# Patient Record
Sex: FEMALE | Race: WHITE | NOT HISPANIC OR LATINO | URBAN - METROPOLITAN AREA
[De-identification: names, ages, dates, MRNs, and addresses within clinical notes are randomized per-mention and may not be internally consistent; named-entity substitution may affect disease eponyms.]

---

## 2017-09-02 ENCOUNTER — EMERGENCY (EMERGENCY)
Facility: HOSPITAL | Age: 47
LOS: 0 days | Discharge: HOME | End: 2017-09-02
Attending: EMERGENCY MEDICINE | Admitting: EMERGENCY MEDICINE
Payer: COMMERCIAL

## 2017-09-02 VITALS
RESPIRATION RATE: 18 BRPM | DIASTOLIC BLOOD PRESSURE: 71 MMHG | TEMPERATURE: 98 F | SYSTOLIC BLOOD PRESSURE: 121 MMHG | HEART RATE: 64 BPM | OXYGEN SATURATION: 100 %

## 2017-09-02 VITALS — HEIGHT: 66 IN | WEIGHT: 145.06 LBS
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
## 2017-09-02 DIAGNOSIS — S42.291A OTHER DISPLACED FRACTURE OF UPPER END OF RIGHT HUMERUS, INITIAL ENCOUNTER FOR CLOSED FRACTURE: ICD-10-CM

## 2017-09-02 DIAGNOSIS — M79.601 PAIN IN RIGHT ARM: ICD-10-CM
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
## 2017-09-02 PROCEDURE — 29130 APPL FINGER SPLINT STATIC: CPT | Mod: RT

## 2017-09-02 PROCEDURE — 73030 X-RAY EXAM OF SHOULDER: CPT | Mod: 26,RT

## 2017-09-02 PROCEDURE — 29240 STRAPPING OF SHOULDER: CPT | Mod: RT

## 2017-09-02 PROCEDURE — 73060 X-RAY EXAM OF HUMERUS: CPT | Mod: 26,RT

## 2017-09-02 PROCEDURE — 99285 EMERGENCY DEPT VISIT HI MDM: CPT

## 2017-09-02 RX ORDER — OXYCODONE AND ACETAMINOPHEN 5; 325 MG/1; MG/1
1 TABLET ORAL ONCE
Qty: 0 | Refills: 0 | Status: DISCONTINUED | OUTPATIENT
Start: 2017-09-02 | End: 2017-09-02

## 2017-09-02 RX ORDER — IBUPROFEN 200 MG
600 TABLET ORAL ONCE
Qty: 0 | Refills: 0 | Status: COMPLETED | OUTPATIENT
Start: 2017-09-02 | End: 2017-09-02

## 2017-09-02 RX ADMIN — Medication 600 MILLIGRAM(S): at 19:45

## 2017-09-02 RX ADMIN — OXYCODONE AND ACETAMINOPHEN 1 TABLET(S): 5; 325 TABLET ORAL at 19:45

## 2017-09-02 NOTE — ED STATDOCS - PROGRESS NOTE DETAILS
Patient seen and evaluated, +fx on xray, placed in shoulder immobilizer, RICE reviewed, will send rx for pain medication -Tiesha Robins PA-C Patient was agreeable to plan, understood that orthopedic f/u in a few days and wearing immobilizer was appropriate treatment.  Pain medication sent to pharmacy requested by patient after being given a dose here in the ER.  Patient sister came to pick her up and started complaining that patient was not appropriately treated and pain medication was sent to a pharmacy that is not 24 hours and demanded to be seen by orthopedic because it is a holiday weekend and she shouldn't have to wait.  Case d/w Dr. Alanis who was already on his way in for another case and agreed to see her.  Will send 2 doses of pain medication to a 24 hour pharmacy -Tiesha Robins PA-C

## 2017-09-02 NOTE — ED STATDOCS - OBJECTIVE STATEMENT
47 y/o female with no PMHx presents to the ED c/o right arm pain s/p fall 3 hours ago.  She tripped on rug, fell and hit right arm.  She is c/o pain to right should when she tries to move her arm, no resting pain.  Denies back pain, hip pain.  Did not hit head, no LOC  Has not taken anything for pain.

## 2017-09-02 NOTE — ED STATDOCS - MEDICAL DECISION MAKING DETAILS
45 y/o female presents to the ED c/o right arm with movement s/p fall. Plan pain control, XR, reassess.

## 2017-09-02 NOTE — ED STATDOCS - MUSCULOSKELETAL, MLM
No right upper extremity TPP.  +Range of motion limited right shoulder and right elbow due to pain.  Full ROM right wrist.  Strong radial pulse.  No scaphoid TPP.  No C-spine TPP No right upper extremity TPP.  +Range of motion limited right shoulder and right elbow due to pain.  Full ROM right wrist.  Strong radial pulse.  Neurovascularly intact.  No scaphoid TPP.  No C-spine TPP

## 2017-09-02 NOTE — ED PROCEDURE NOTE - NS ED PERI VASCULAR NEG
no cyanosis of extremity/fingers/toes warm to touch/capillary refill time < 2 seconds/no paresthesia/no swelling

## 2021-02-28 ENCOUNTER — NON-APPOINTMENT (OUTPATIENT)
Age: 51
End: 2021-02-28

## 2021-04-15 ENCOUNTER — NON-APPOINTMENT (OUTPATIENT)
Age: 51
End: 2021-04-15

## 2021-04-15 DIAGNOSIS — J01.40 ACUTE PANSINUSITIS, UNSPECIFIED: ICD-10-CM

## 2021-04-15 DIAGNOSIS — Z92.89 PERSONAL HISTORY OF OTHER MEDICAL TREATMENT: ICD-10-CM

## 2021-04-15 DIAGNOSIS — F41.1 GENERALIZED ANXIETY DISORDER: ICD-10-CM

## 2021-04-15 DIAGNOSIS — Z86.79 PERSONAL HISTORY OF OTHER DISEASES OF THE CIRCULATORY SYSTEM: ICD-10-CM

## 2021-04-15 DIAGNOSIS — E56.8 DEFICIENCY OF OTHER VITAMINS: ICD-10-CM

## 2021-04-15 DIAGNOSIS — R05 COUGH: ICD-10-CM

## 2021-04-15 DIAGNOSIS — Z78.9 OTHER SPECIFIED HEALTH STATUS: ICD-10-CM

## 2021-04-15 DIAGNOSIS — J06.9 ACUTE UPPER RESPIRATORY INFECTION, UNSPECIFIED: ICD-10-CM

## 2021-04-15 DIAGNOSIS — Z86.59 PERSONAL HISTORY OF OTHER MENTAL AND BEHAVIORAL DISORDERS: ICD-10-CM

## 2021-04-15 DIAGNOSIS — G43.909 MIGRAINE, UNSPECIFIED, NOT INTRACTABLE, W/OUT STATUS MIGRAINOSUS: ICD-10-CM

## 2021-04-15 RX ORDER — FLUTICASONE PROPIONATE 50 UG/1
SPRAY, METERED NASAL
Refills: 0 | Status: ACTIVE | COMMUNITY

## 2021-04-22 ENCOUNTER — APPOINTMENT (OUTPATIENT)
Dept: FAMILY MEDICINE | Facility: CLINIC | Age: 51
End: 2021-04-22

## 2021-04-28 ENCOUNTER — APPOINTMENT (OUTPATIENT)
Dept: FAMILY MEDICINE | Facility: CLINIC | Age: 51
End: 2021-04-28

## 2021-05-04 ENCOUNTER — APPOINTMENT (OUTPATIENT)
Dept: FAMILY MEDICINE | Facility: CLINIC | Age: 51
End: 2021-05-04
Payer: COMMERCIAL

## 2021-05-04 VITALS
OXYGEN SATURATION: 99 % | HEART RATE: 76 BPM | WEIGHT: 140 LBS | BODY MASS INDEX: 21.22 KG/M2 | TEMPERATURE: 96.9 F | DIASTOLIC BLOOD PRESSURE: 62 MMHG | SYSTOLIC BLOOD PRESSURE: 108 MMHG | HEIGHT: 68 IN

## 2021-05-04 DIAGNOSIS — Z00.00 ENCOUNTER FOR GENERAL ADULT MEDICAL EXAMINATION W/OUT ABNORMAL FINDINGS: ICD-10-CM

## 2021-05-04 PROCEDURE — 99072 ADDL SUPL MATRL&STAF TM PHE: CPT

## 2021-05-04 PROCEDURE — 99396 PREV VISIT EST AGE 40-64: CPT

## 2021-05-04 RX ORDER — ALBUTEROL 90 MCG
90 AEROSOL (GRAM) INHALATION
Refills: 0 | Status: DISCONTINUED | COMMUNITY
End: 2021-05-04

## 2021-05-04 RX ORDER — ONDANSETRON 8 MG/1
8 TABLET, ORALLY DISINTEGRATING ORAL
Refills: 0 | Status: DISCONTINUED | COMMUNITY
End: 2021-05-04

## 2021-05-04 RX ORDER — MECLIZINE HYDROCHLORIDE 25 MG/1
25 TABLET ORAL
Refills: 0 | Status: DISCONTINUED | COMMUNITY
End: 2021-05-04

## 2021-05-04 NOTE — HEALTH RISK ASSESSMENT
[Very Good] : ~his/her~  mood as very good [Yes] : Yes [Monthly or less (1 pt)] : Monthly or less (1 point) [1 or 2 (0 pts)] : 1 or 2 (0 points) [Never (0 pts)] : Never (0 points) [0] : 2) Feeling down, depressed, or hopeless: Not at all (0) [Patient reported mammogram was normal] : Patient reported mammogram was normal [Patient reported PAP Smear was normal] : Patient reported PAP Smear was normal [With Family] : lives with family [College] : College [] :  [# Of Children ___] : has [unfilled] children [Sexually Active] : sexually active [Smoke Detector] : smoke detector [Carbon Monoxide Detector] : carbon monoxide detector [Safety elements used in home] : safety elements used in home [Seat Belt] :  uses seat belt [Sunscreen] : uses sunscreen [] : No [No falls in past year] : Patient reported no falls in the past year [de-identified] : walking intermittently  [de-identified] : healthy  [Change in mental status noted] : No change in mental status noted [Feels Safe at Home] : Feels safe at home [Reports changes in hearing] : Reports no changes in hearing [Reports changes in vision] : Reports no changes in vision [Reports normal functional visual acuity (ie: able to read med bottle)] : Reports normal functional visual acuity [Reports changes in dental health] : Reports no changes in dental health [MammogramDate] : 2018  [PapSmearDate] : 3/2021  [BoneDensityDate] : 2018  [BoneDensityComments] : osteopenia  [ColonoscopyComments] : never [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

## 2021-05-04 NOTE — PAST MEDICAL HISTORY
[Perimenopausal] : perimenopausal [Irregular Cycle Intervals] : are  irregular [Total Preg ___] : G[unfilled] [Live Births ___] : P[unfilled]

## 2021-05-04 NOTE — PLAN
[FreeTextEntry1] : \par \par Patient had normal exam today , fasting labs drawn this am at Cardiology . Provided pt with Mammogram request and reminded she is due for Colonoscopy \par Will repeat PE in one year

## 2021-05-04 NOTE — HISTORY OF PRESENT ILLNESS
[de-identified] : Patient is here for yearly PE , she is feeling much improved since her URI /Covid Symptoms last month. She has been seen at Pulmonology and then Cardiology , due to mild Pericardial Effusion . Pt had full comprehensive labs this am for Cardiology at [facility]. SHe presently is not taking any inhalers as she is asymptomatic \par She admits she is due for Mammogram and Colonoscopy

## 2021-05-12 ENCOUNTER — APPOINTMENT (OUTPATIENT)
Age: 51
End: 2021-05-12

## 2021-07-26 ENCOUNTER — NON-APPOINTMENT (OUTPATIENT)
Age: 51
End: 2021-07-26

## 2022-02-02 ENCOUNTER — APPOINTMENT (OUTPATIENT)
Dept: FAMILY MEDICINE | Facility: CLINIC | Age: 52
End: 2022-02-02
Payer: COMMERCIAL

## 2022-02-02 VITALS
OXYGEN SATURATION: 99 % | HEART RATE: 73 BPM | DIASTOLIC BLOOD PRESSURE: 78 MMHG | TEMPERATURE: 97.5 F | SYSTOLIC BLOOD PRESSURE: 118 MMHG | WEIGHT: 138 LBS | BODY MASS INDEX: 20.92 KG/M2 | HEIGHT: 68 IN

## 2022-02-02 PROCEDURE — 99213 OFFICE O/P EST LOW 20 MIN: CPT

## 2022-02-02 NOTE — PHYSICAL EXAM
[No Edema] : there was no peripheral edema [Normal] : no rash [Coordination Grossly Intact] : coordination grossly intact [No Focal Deficits] : no focal deficits [Normal Gait] : normal gait [Normal Affect] : the affect was normal [Normal Insight/Judgement] : insight and judgment were intact [de-identified] : small amount of cerumen bilaterally worse on left than right, visualized TMs normal [de-identified] : CN II-XII intact, no facial asymmetry, no nystagmus

## 2022-02-02 NOTE — HEALTH RISK ASSESSMENT
[Former] : Former [Yes] : Yes [Monthly or less (1 pt)] : Monthly or less (1 point) [1 or 2 (0 pts)] : 1 or 2 (0 points) [Never (0 pts)] : Never (0 points) [No] : In the past 12 months have you used drugs other than those required for medical reasons? No [No falls in past year] : Patient reported no falls in the past year [0] : 2) Feeling down, depressed, or hopeless: Not at all (0) [PHQ-2 Negative - No further assessment needed] : PHQ-2 Negative - No further assessment needed [YearQuit] : formerly social [Audit-CScore] : 1 [de-identified] : active -- walking [de-identified] : well balanced [[id number]] : 0

## 2022-02-02 NOTE — HISTORY OF PRESENT ILLNESS
[FreeTextEntry8] : Pt is a 52yo female presenting to the office complaining of mild dizziness for the past several days.\par Pt states she has had intermittent dizziness since Friday, 01/28/2022\par Received acupuncture last week had some dizziness after\par pt states she is feeling off, slight dizziness, no lightheadedness/near syncope, no room spinning\par dizziness seems to be positional, worse with head movement, cannot lay on her right side without becoming dizzy.\par denies headaches, vision/hearing changes, difficulty with speech/swallow, or extremity numbness/tingling/weakness\par denies chest pain or SOB\par Has nausea but no vomiting\par Took Zofran last night with improvement of nausea and dizziness.\par Has booster shot scheduled for tomorrow which is causing her to have increased anxiety.\par Long COVID January\par Received Moderna x2 in June\par Had dizziness, didn't feel well approximately 1 week after immunizations\par \par Pt has history of pericardial effusion -- resolved.\par New Cardiologist Dr. Hernandez\par Cardiology formerly Dr. Miller, was previously on Statin for calcium found on coronary CTA however pt felt dizziness was worse with statin, saw Dr. Hernandez who does not believe pt needs to be on statin so d/c'ed.

## 2022-02-02 NOTE — ASSESSMENT
[FreeTextEntry1] : Patient is a 50yo female presenting to the office complaining of mild positional dizziness for the last several days, improved with Zofran yesterday.  Has COVID booster vaccination (pfizer) scheduled for tomorrow and is concerned as she has had dizziness as a side effect from her previous 2 COVID (Moderna) vaccinations.\par \par Dizziness\par - Take Meclizine every 8 hours as needed for dizziness.  Do not drink alcohol, drive or operate heavy machinery when taking Meclizine as it causes drowsiness.\par - Consider Benadryl prior to COVID vaccination and as needed for dizziness.  Do not drink alcohol, drive or operate heavy machinery when taking Benadryl as it causes drowsiness.\par - Continue Zofran as needed for nausea.\par - Call the office or go to the ED immediately if you develop new, worsening or concerning symptoms including high fever, severe headache/worst headache of your life, confusion, dizziness/lightheadedness, loss of consciousness, severe chest pain, difficulty breathing, shortness of breath, severe abdominal pain, excessive vomiting/diarrhea, inability to feel/move the extremities, or any other concerning symptoms.\par

## 2022-03-10 ENCOUNTER — APPOINTMENT (OUTPATIENT)
Dept: NEUROLOGY | Facility: CLINIC | Age: 52
End: 2022-03-10

## 2022-06-26 DIAGNOSIS — Z87.01 PERSONAL HISTORY OF PNEUMONIA (RECURRENT): ICD-10-CM

## 2022-06-28 ENCOUNTER — NON-APPOINTMENT (OUTPATIENT)
Age: 52
End: 2022-06-28

## 2022-06-28 ENCOUNTER — APPOINTMENT (OUTPATIENT)
Dept: FAMILY MEDICINE | Facility: CLINIC | Age: 52
End: 2022-06-28
Payer: COMMERCIAL

## 2022-06-28 VITALS
SYSTOLIC BLOOD PRESSURE: 118 MMHG | OXYGEN SATURATION: 100 % | WEIGHT: 138 LBS | HEIGHT: 68 IN | DIASTOLIC BLOOD PRESSURE: 72 MMHG | TEMPERATURE: 97.6 F | HEART RATE: 76 BPM | BODY MASS INDEX: 20.92 KG/M2

## 2022-06-28 DIAGNOSIS — Z87.898 PERSONAL HISTORY OF OTHER SPECIFIED CONDITIONS: ICD-10-CM

## 2022-06-28 DIAGNOSIS — R92.2 INCONCLUSIVE MAMMOGRAM: ICD-10-CM

## 2022-06-28 PROCEDURE — 99396 PREV VISIT EST AGE 40-64: CPT | Mod: 25

## 2022-06-28 PROCEDURE — 36415 COLL VENOUS BLD VENIPUNCTURE: CPT

## 2022-06-28 PROCEDURE — 93000 ELECTROCARDIOGRAM COMPLETE: CPT

## 2022-06-28 RX ORDER — MECLIZINE HYDROCHLORIDE 25 MG/1
25 TABLET ORAL EVERY 6 HOURS
Qty: 40 | Refills: 2 | Status: ACTIVE | COMMUNITY
Start: 2021-07-29 | End: 1900-01-01

## 2022-06-28 RX ORDER — ONDANSETRON 4 MG/1
4 TABLET, ORALLY DISINTEGRATING ORAL 3 TIMES DAILY
Qty: 30 | Refills: 0 | Status: ACTIVE | COMMUNITY
Start: 2022-02-02 | End: 1900-01-01

## 2022-06-28 RX ORDER — ALPRAZOLAM 0.25 MG/1
0.25 TABLET ORAL
Qty: 30 | Refills: 0 | Status: ACTIVE | COMMUNITY
Start: 1900-01-01 | End: 1900-01-01

## 2022-06-28 NOTE — PHYSICAL EXAM
[No Acute Distress] : no acute distress [Well Nourished] : well nourished [Well Developed] : well developed [Well-Appearing] : well-appearing [Normal Sclera/Conjunctiva] : normal sclera/conjunctiva [EOMI] : extraocular movements intact [Normal Outer Ear/Nose] : the outer ears and nose were normal in appearance [No JVD] : no jugular venous distention [No Lymphadenopathy] : no lymphadenopathy [Supple] : supple [Thyroid Normal, No Nodules] : the thyroid was normal and there were no nodules present [No Accessory Muscle Use] : no accessory muscle use [No Respiratory Distress] : no respiratory distress  [Clear to Auscultation] : lungs were clear to auscultation bilaterally [Normal Rate] : normal rate  [Regular Rhythm] : with a regular rhythm [Normal S1, S2] : normal S1 and S2 [No Murmur] : no murmur heard [No Carotid Bruits] : no carotid bruits [No Varicosities] : no varicosities [Pedal Pulses Present] : the pedal pulses are present [No Edema] : there was no peripheral edema [No Extremity Clubbing/Cyanosis] : no extremity clubbing/cyanosis [Soft] : abdomen soft [Non Tender] : non-tender [Non-distended] : non-distended [No Masses] : no abdominal mass palpated [No HSM] : no HSM [Normal Bowel Sounds] : normal bowel sounds [Normal Posterior Cervical Nodes] : no posterior cervical lymphadenopathy [Normal Anterior Cervical Nodes] : no anterior cervical lymphadenopathy [No Joint Swelling] : no joint swelling [Grossly Normal Strength/Tone] : grossly normal strength/tone [No Rash] : no rash [Coordination Grossly Intact] : coordination grossly intact [No Focal Deficits] : no focal deficits [Normal Gait] : normal gait [Normal Affect] : the affect was normal [Normal Insight/Judgement] : insight and judgment were intact [de-identified] : slender frame

## 2022-06-28 NOTE — REVIEW OF SYSTEMS
[Anxiety] : anxiety [Negative] : Heme/Lymph [Suicidal] : not suicidal [Insomnia] : no insomnia [Depression] : no depression [de-identified] : situational anxiety for which she uses self care measures and occas Alprazolam (well tolerated and effective and she limits use to rare/occas)

## 2022-06-28 NOTE — ASSESSMENT
[FreeTextEntry1] : LISSET ZARATE is a 51 year old female here for a physical exam.  She is also here to follow up on medical issues as noted above.\par

## 2022-06-28 NOTE — PLAN
[FreeTextEntry1] : Reviewed age-appropriate preventive screening tests with patient. UTD on gyn exam. Due for mammogram and baseline CRC screening (recommend colonoscopy in her case given FH colon cancer and colon polyps) and she will schedule for near future. ECG wnl .\par \par Shingrix revd/recommended and she will consider. Also recommended she get a dose of PCV 20 due to h/o pneumonia and she will consider. \par \par Check labs today. Continue same meds/doses pending lab results. \par \par Discussed clean eating (eg Mediterranean style eating plan) and regular exercise/staying as physically active as possible.  Include balance exercises and strength training and core strengthening exercises for bone health and to decrease risk for falls. \par \par Reviewed importance of good self care (eg meditation, yoga, adequate rest, regular exercise, magnesium, clean eating etc).\par \par Revd r/b/se Alprazolam and need to cont to limit use as much as possible as she does. If incr use over time she should consider trial of daily SSRI type med (not needed at this time as she has only occas/situational sxs).\par \par Meclizine and Ondansetron revd (indications for use, what these meds treat, how to use them etc) and she would like RF as prior meds are  and she will replace supply as likes to keep these on hand for prn use mainly with travel \par \par Next CPE in 1-2 years.

## 2022-06-28 NOTE — HISTORY OF PRESENT ILLNESS
[de-identified] : Her last PE was last year\par \par Vaccines:\par Tetanus shot is up to date, Tdap 8/2017\par Pneumococcal vaccination is NOT  up to date, recommend dose of PCV 20 due to h/o pneumonia\par Shingrix vaccination is NOT up to date\par COVID vaccine is up to date x 3 doses\par \par Her last dentist visit was within past 6-12 months\par Her last eye doctor appointment was within past year\par \par Her diet is healthful/clean overall\par Exercise: walking briskly\par \par Her GYN visits are up to date, Spring 2022, Dr. Hendrix, had polyp (?cervical, ?endometrial) that was removed in office and is wnl. \par Her Mammogram screening is NOT up to date, 3/2021, plans to schedule for near future \par Her Colorectal cancer screening is NOT up to date, never had any screening\par \par Zenaida has h/o situational anxiety (flights, travel) for which she keeps Alprazolam on hand for occas/rare use. \par \par Travelling to [place] to see her friend this summer and wanted to check if UTD Tdap (we revd that she is). Also she had an episode of anxiety/dizziness/nausea and vomiting in the past triggered by a combination of anxiety and motion sickness so she keeps Alprazolam, Meclizine, Ondansetron on hand for prn use. Rarely to never uses these meds but likes to keep on hand. Meds are well tolerated when needed\par \par Had COVID infection in 12/2020. When had acute COVID she intermittent odd feeling in back of neck that resolved; was possibly associated with some anter neck/throat sensation (not pain). Not any trouble with swallowing. Had no sxs since that COVID infection until past few days has noticed occas odd sensation (not pain) post neck similar to what she had when had COVID infection. Did COVID test last night and was negative. Did acupuncture and was having treatment for laryngeal pain when had COVID infection in past and thinks that helped the neck sensations to resolve . No pain or other assoc sxs and feels well. No seasonal allergies. We revd that I am not sure what her sxs are due to but in isolation I am not concerned about intermittent mild odd sensation post neck without other assoc sxs like pain, fever, stiffness etc). If other sxs develop she should RTO for eval/further discussion. Also can consider trying acupuncture again as worked in past. Also if any throat/post nasal drip sxs can try nasal saline/irrigation, nasal steroid spray etc \par \par Saw card in early 2021 (as friend who is an RN advised that anyone who had COVID should have card eval post infection) after she had COVID. Was told she had high chol and was given Rx med for this (?name). Then saw second card (Dr. Hernandez) who reassured that her chol really is ok (mainly TC is high because she has high HDL level) and med was stopped.

## 2022-06-28 NOTE — HEALTH RISK ASSESSMENT
[0] : 2) Feeling down, depressed, or hopeless: Not at all (0) [PHQ-2 Negative - No further assessment needed] : PHQ-2 Negative - No further assessment needed [[id number]] : 0

## 2022-06-29 ENCOUNTER — NON-APPOINTMENT (OUTPATIENT)
Age: 52
End: 2022-06-29

## 2022-06-29 LAB
ALBUMIN SERPL ELPH-MCNC: 5 G/DL
ALP BLD-CCNC: 98 U/L
ALT SERPL-CCNC: 16 U/L
ANION GAP SERPL CALC-SCNC: 11 MMOL/L
AST SERPL-CCNC: 21 U/L
BILIRUB SERPL-MCNC: 1.2 MG/DL
BUN SERPL-MCNC: 11 MG/DL
CALCIUM SERPL-MCNC: 9.9 MG/DL
CHLORIDE SERPL-SCNC: 104 MMOL/L
CHOLEST SERPL-MCNC: 263 MG/DL
CO2 SERPL-SCNC: 28 MMOL/L
CREAT SERPL-MCNC: 0.66 MG/DL
EGFR: 105 ML/MIN/1.73M2
GLUCOSE SERPL-MCNC: 91 MG/DL
HDLC SERPL-MCNC: 95 MG/DL
LDLC SERPL CALC-MCNC: 146 MG/DL
NONHDLC SERPL-MCNC: 168 MG/DL
POTASSIUM SERPL-SCNC: 5 MMOL/L
PROT SERPL-MCNC: 7.2 G/DL
SODIUM SERPL-SCNC: 143 MMOL/L
TRIGL SERPL-MCNC: 107 MG/DL
TSH SERPL-ACNC: 1.76 UIU/ML

## 2022-07-15 ENCOUNTER — NON-APPOINTMENT (OUTPATIENT)
Age: 52
End: 2022-07-15

## 2022-07-22 ENCOUNTER — NON-APPOINTMENT (OUTPATIENT)
Age: 52
End: 2022-07-22

## 2022-09-28 ENCOUNTER — MED ADMIN CHARGE (OUTPATIENT)
Age: 52
End: 2022-09-28

## 2022-09-28 ENCOUNTER — APPOINTMENT (OUTPATIENT)
Dept: FAMILY MEDICINE | Facility: CLINIC | Age: 52
End: 2022-09-28

## 2022-09-28 DIAGNOSIS — Z11.1 ENCOUNTER FOR SCREENING FOR RESPIRATORY TUBERCULOSIS: ICD-10-CM

## 2022-09-28 PROCEDURE — 86580 TB INTRADERMAL TEST: CPT

## 2022-09-30 ENCOUNTER — APPOINTMENT (OUTPATIENT)
Dept: FAMILY MEDICINE | Facility: CLINIC | Age: 52
End: 2022-09-30

## 2022-09-30 PROCEDURE — ZZZZZ: CPT

## 2022-10-21 NOTE — ED ADULT NURSE NOTE - OBJECTIVE STATEMENT
s/p trip and fall No Repair - Repaired With Adjacent Surgical Defect Text (Leave Blank If You Do Not Want): After obtaining clear surgical margins the defect was repaired concurrently with another surgical defect which was in close approximation.

## 2023-01-18 ENCOUNTER — NON-APPOINTMENT (OUTPATIENT)
Age: 53
End: 2023-01-18

## 2023-01-23 ENCOUNTER — NON-APPOINTMENT (OUTPATIENT)
Age: 53
End: 2023-01-23

## 2023-01-24 ENCOUNTER — APPOINTMENT (OUTPATIENT)
Dept: FAMILY MEDICINE | Facility: CLINIC | Age: 53
End: 2023-01-24
Payer: COMMERCIAL

## 2023-01-24 VITALS
DIASTOLIC BLOOD PRESSURE: 62 MMHG | OXYGEN SATURATION: 98 % | HEART RATE: 79 BPM | SYSTOLIC BLOOD PRESSURE: 114 MMHG | WEIGHT: 151 LBS | BODY MASS INDEX: 22.88 KG/M2 | TEMPERATURE: 97 F | HEIGHT: 68 IN

## 2023-01-24 DIAGNOSIS — R63.5 ABNORMAL WEIGHT GAIN: ICD-10-CM

## 2023-01-24 PROCEDURE — 36415 COLL VENOUS BLD VENIPUNCTURE: CPT

## 2023-01-24 PROCEDURE — 99214 OFFICE O/P EST MOD 30 MIN: CPT | Mod: 25

## 2023-01-24 RX ORDER — AZITHROMYCIN 250 MG/1
250 TABLET, FILM COATED ORAL
Qty: 1 | Refills: 0 | Status: DISCONTINUED | COMMUNITY
Start: 2022-07-22 | End: 2023-01-24

## 2023-01-24 NOTE — HISTORY OF PRESENT ILLNESS
[FreeTextEntry8] : Pt is a 51yo female presenting to the office complaining of weight gain, fatigue.\par Pt reports weight gain.  Does not weigh herself at home but has noticed difficulty buttoning pants for the past 2-3 months.\par Last weighted at last visit in 06/2022 was 138lbs, today was 151lbs.\par States she feels very fatigued as well.\par Has mild intermittent dizziness, has been ongoing for a long time, states has an issue with sitting in a car, feels off balanced, etc.  Has had w/u with ENT which has been normal.\par Pt has mild shortness of breath/palpitations occasionally which is chronic.\par Dizziness/SOB/palpitations are all chronic and not changed since onset of current symptoms.\par Denies chest pain.\par Pt admits she has had a poor diet recently/through the holidays.\par Would like thyroid testing.\par Pt's mother and cousins have history of hypothyroidism.\par Denies skin/hair changes.\par Denies abnormal vaginal bleeding, BRBPR, dark/tarry stools, or bleeding elsewhere.\par Denies known tick exposure.

## 2023-01-24 NOTE — ASSESSMENT
[FreeTextEntry1] : Patient is a 51yo female presenting to the office complaining of fatigue, weight gain over the past 2-3 months.  Pt has chronic dizziness, palpitations, and SOB which have not changed with current symptoms.  Last EKG in 06/2022 WNL.\par \par Fatigue, Weight Gain\par - Labs drawn in office.  Pt fasting and with hx HLD so will check cholesterol levels as well today.\par - Make sure you are staying well hydrated, drinking plenty of water.\par - Eat plenty of fruits and vegetables, especially deeply colored fruits/vegetables (such as leafy greens, peaches) that are more nutrient-dense.  Continue to work hard on diet and exercise, limiting/avoiding saturated fat, fatty foods, greasy foods, red meats, white flour-based carbohydrates (cookies, cakes, white bread, white rice), and added sugars.  Chose whole grain foods and products made with whole grains over refined grains and white flour-based carbohydrates.  Avoid beverages and food with added sugar.  Limit salt intake to improve blood pressure.  Limit alcohol intake.\par - Try and incorporate 30 minutes of aerobic exercise to your daily routine.\par \par Call the office or go to the ED immediately if you develop new, worsening or concerning symptoms including high fever, severe headache/worst headache of your life, confusion, dizziness/lightheadedness, loss of consciousness, severe chest pain, difficulty breathing, shortness of breath, severe abdominal pain, excessive vomiting/diarrhea, inability to feel/move the extremities, or any other concerning symptoms.\par

## 2023-01-24 NOTE — REVIEW OF SYSTEMS
[Fever] : no fever [Chills] : no chills [Fatigue] : fatigue [Recent Change In Weight] : ~T recent weight change [Chest Pain] : no chest pain [Palpitations] : palpitations [Shortness Of Breath] : shortness of breath [Wheezing] : no wheezing [Cough] : no cough [Dyspnea on Exertion] : no dyspnea on exertion [Headache] : no headache [Dizziness] : dizziness [Negative] : Heme/Lymph

## 2023-01-25 LAB
25(OH)D3 SERPL-MCNC: 31 NG/ML
ALBUMIN SERPL ELPH-MCNC: 4.8 G/DL
ALP BLD-CCNC: 98 U/L
ALT SERPL-CCNC: 18 U/L
ANION GAP SERPL CALC-SCNC: 9 MMOL/L
AST SERPL-CCNC: 21 U/L
BASOPHILS # BLD AUTO: 0.03 K/UL
BASOPHILS NFR BLD AUTO: 0.4 %
BILIRUB SERPL-MCNC: 1 MG/DL
BUN SERPL-MCNC: 13 MG/DL
CALCIUM SERPL-MCNC: 10 MG/DL
CHLORIDE SERPL-SCNC: 103 MMOL/L
CHOLEST SERPL-MCNC: 258 MG/DL
CO2 SERPL-SCNC: 30 MMOL/L
CREAT SERPL-MCNC: 0.65 MG/DL
EGFR: 105 ML/MIN/1.73M2
EOSINOPHIL # BLD AUTO: 0.15 K/UL
EOSINOPHIL NFR BLD AUTO: 1.9 %
GLUCOSE SERPL-MCNC: 87 MG/DL
HCT VFR BLD CALC: 43.3 %
HDLC SERPL-MCNC: 83 MG/DL
HGB BLD-MCNC: 14.2 G/DL
IMM GRANULOCYTES NFR BLD AUTO: 0.1 %
LDLC SERPL CALC-MCNC: 142 MG/DL
LYMPHOCYTES # BLD AUTO: 1.78 K/UL
LYMPHOCYTES NFR BLD AUTO: 23 %
MAN DIFF?: NORMAL
MCHC RBC-ENTMCNC: 31.6 PG
MCHC RBC-ENTMCNC: 32.8 GM/DL
MCV RBC AUTO: 96.2 FL
MONOCYTES # BLD AUTO: 0.69 K/UL
MONOCYTES NFR BLD AUTO: 8.9 %
NEUTROPHILS # BLD AUTO: 5.08 K/UL
NEUTROPHILS NFR BLD AUTO: 65.7 %
NONHDLC SERPL-MCNC: 175 MG/DL
PLATELET # BLD AUTO: 212 K/UL
POTASSIUM SERPL-SCNC: 4.4 MMOL/L
PROT SERPL-MCNC: 7.3 G/DL
RBC # BLD: 4.5 M/UL
RBC # FLD: 13 %
SODIUM SERPL-SCNC: 142 MMOL/L
T4 FREE SERPL-MCNC: 1.2 NG/DL
TRIGL SERPL-MCNC: 165 MG/DL
TSH SERPL-ACNC: 2.02 UIU/ML
VIT B12 SERPL-MCNC: 694 PG/ML
WBC # FLD AUTO: 7.74 K/UL

## 2023-01-27 ENCOUNTER — APPOINTMENT (OUTPATIENT)
Dept: FAMILY MEDICINE | Facility: CLINIC | Age: 53
End: 2023-01-27

## 2023-01-30 ENCOUNTER — NON-APPOINTMENT (OUTPATIENT)
Age: 53
End: 2023-01-30

## 2023-06-30 ENCOUNTER — APPOINTMENT (OUTPATIENT)
Dept: FAMILY MEDICINE | Facility: CLINIC | Age: 53
End: 2023-06-30

## 2023-06-30 DIAGNOSIS — E78.00 PURE HYPERCHOLESTEROLEMIA, UNSPECIFIED: ICD-10-CM

## 2023-06-30 DIAGNOSIS — Z00.00 ENCOUNTER FOR GENERAL ADULT MEDICAL EXAMINATION W/OUT ABNORMAL FINDINGS: ICD-10-CM

## 2023-06-30 DIAGNOSIS — F41.8 OTHER SPECIFIED ANXIETY DISORDERS: ICD-10-CM

## 2024-03-01 NOTE — CURRENT MEDS
[Takes medication as prescribed] : takes
Presents to ED c/o 2 weeks of fever, cough and headache. Pt states that she has been sick, but last night developed a headache which promoted her to come to the ED.

## 2024-05-30 ENCOUNTER — APPOINTMENT (OUTPATIENT)
Dept: FAMILY MEDICINE | Facility: CLINIC | Age: 54
End: 2024-05-30
Payer: COMMERCIAL

## 2024-05-30 VITALS
SYSTOLIC BLOOD PRESSURE: 118 MMHG | WEIGHT: 146 LBS | DIASTOLIC BLOOD PRESSURE: 72 MMHG | BODY MASS INDEX: 22.13 KG/M2 | HEIGHT: 68 IN | TEMPERATURE: 97.6 F | HEART RATE: 92 BPM | OXYGEN SATURATION: 100 %

## 2024-05-30 DIAGNOSIS — M54.50 LOW BACK PAIN, UNSPECIFIED: ICD-10-CM

## 2024-05-30 DIAGNOSIS — F43.0 GENERALIZED ANXIETY DISORDER: ICD-10-CM

## 2024-05-30 DIAGNOSIS — R21 RASH AND OTHER NONSPECIFIC SKIN ERUPTION: ICD-10-CM

## 2024-05-30 DIAGNOSIS — R42 DIZZINESS AND GIDDINESS: ICD-10-CM

## 2024-05-30 DIAGNOSIS — R53.83 OTHER FATIGUE: ICD-10-CM

## 2024-05-30 DIAGNOSIS — F41.1 GENERALIZED ANXIETY DISORDER: ICD-10-CM

## 2024-05-30 DIAGNOSIS — R20.8 OTHER DISTURBANCES OF SKIN SENSATION: ICD-10-CM

## 2024-05-30 DIAGNOSIS — R11.0 NAUSEA: ICD-10-CM

## 2024-05-30 PROCEDURE — 36415 COLL VENOUS BLD VENIPUNCTURE: CPT

## 2024-05-30 PROCEDURE — 99214 OFFICE O/P EST MOD 30 MIN: CPT

## 2024-05-30 NOTE — PHYSICAL EXAM
[Normal] : affect was normal and insight and judgment were intact [de-identified] : erythematous rash on bilateral upper feet and left knee [de-identified] : left foot warm, cap refill <3 secs, no numbness, redness, swelling

## 2024-05-30 NOTE — PLAN
[FreeTextEntry1] : - OTC hydrocortisone on the rash  - rest, hydration  - get physical activity into daily routine   - discussed Cologuard options, pt hesitant because if it comes back negative, she will need to do a colonoscopy and she cannot do one. recommended to follow up with GI  - dizziness and nausea could be worsened by wearing improper reading glasses - follow up if no improvements after getting new prescriptions - left shin decreased sensitivity could be related to back pain, unlikely to be from DM

## 2024-05-30 NOTE — REVIEW OF SYSTEMS
[Fever] : no fever [Chills] : no chills [Fatigue] : fatigue [Hot Flashes] : no hot flashes [Night Sweats] : no night sweats [Recent Change In Weight] : ~T no recent weight change [Nausea] : nausea [Headache] : no headache [Dizziness] : dizziness [Memory Loss] : no memory loss [Negative] : Psychiatric [FreeTextEntry9] : decreased sensation on left shin [de-identified] : redness and itching on bilateral upper feet

## 2024-05-30 NOTE — HISTORY OF PRESENT ILLNESS
[FreeTextEntry8] : - pt complaining about tops of her bilateral feet became red and itchy the other day after she was at the beach  - seems to have subsided now  - left shin decreased sensation, unsure how long, thinks 1-2 weeks  - worried about DM, as has fam hx of it  - wants to get bloodwork done, not fasting now  - denies trauma, pain, redness and swelling  - chronic lower back pain - chronic right shin numbness after a fall in her early 20s  - blurry vision, saw ophthalmologist today - needs different glasses - chronic dizziness for years now but now also intermittent nausea  - takes a tiny bit of meclizine - works but feels "hungover" later, doesn't like taking it  - used zofran in the past which helps with nausea  - states that thinks these could be residual COVID effects since she had COVID for the first time years ago, had nausea and dizziness with it  - last time had COVID was October of 2023  - doing acupuncture, saw ENT in the past for dizziness who told her that she needs to live with it  - didn't end up doing a colonoscopy because the thought of not eating makes her sick, postponed for now  - gained about 10lbs since 2022, working out less and eating a lot of sweets and [employer] at work - in menopause, about a year since last cycle  - not hydrating well because doesn't want to have to use the bathroom when traveling to work in the city

## 2024-05-31 LAB
ESTIMATED AVERAGE GLUCOSE: 108 MG/DL
HBA1C MFR BLD HPLC: 5.4 %
TSH SERPL-ACNC: 1.28 UIU/ML

## 2024-10-01 ENCOUNTER — NON-APPOINTMENT (OUTPATIENT)
Age: 54
End: 2024-10-01

## 2024-10-01 ENCOUNTER — APPOINTMENT (OUTPATIENT)
Dept: FAMILY MEDICINE | Facility: CLINIC | Age: 54
End: 2024-10-01
Payer: COMMERCIAL

## 2024-10-01 VITALS
OXYGEN SATURATION: 99 % | BODY MASS INDEX: 21.82 KG/M2 | HEIGHT: 68 IN | DIASTOLIC BLOOD PRESSURE: 64 MMHG | HEART RATE: 83 BPM | SYSTOLIC BLOOD PRESSURE: 124 MMHG | TEMPERATURE: 97.2 F | WEIGHT: 144 LBS

## 2024-10-01 DIAGNOSIS — U07.1 COVID-19: ICD-10-CM

## 2024-10-01 DIAGNOSIS — R00.2 PALPITATIONS: ICD-10-CM

## 2024-10-01 PROCEDURE — 99214 OFFICE O/P EST MOD 30 MIN: CPT

## 2024-10-01 PROCEDURE — 93000 ELECTROCARDIOGRAM COMPLETE: CPT

## 2024-10-01 PROCEDURE — G2211 COMPLEX E/M VISIT ADD ON: CPT | Mod: NC

## 2024-10-01 NOTE — ASSESSMENT
[FreeTextEntry1] : Patient is a 54yo female who presents to the office complaining of fatigue, palpitations s/p COVID.  Pt tested positive 1 week ago today, felt better, then yesterday felt symptoms again with fatigue and palpitations, mild SOB.  Pt states she felt the same the last 2 times she had COVID as well.  VSS, well appearing. EKG performed NSR, no acute ST/T changes, no arrhythmias, no significant change from prior in chart. Likely continuation of COVID symptoms, recommended rest, increased fluids, decrease caffeine, supportive care. If persistent/worsening/more frequent symptoms call office ASAP. Will have b/w done at CPE 10/21/2024, alert office sooner if symptoms worsen.  Call the office or go to the ED immediately if you develop new, worsening or concerning symptoms including high fever, severe headache/worst headache of your life, confusion, dizziness/lightheadedness, loss of consciousness, severe chest pain, difficulty breathing, shortness of breath, severe abdominal pain, excessive vomiting/diarrhea, inability to feel/move the extremities, or any other concerning symptoms.

## 2024-10-01 NOTE — HISTORY OF PRESENT ILLNESS
[FreeTextEntry8] : Pt states she had a persistent HA and had to leave work early on Tuesday, 9/24/24, tested mildly positive for COVID. Felt better through the week, then felt worse again yesterday. States yesterday felt very tired, feels like she is having racing heart and short of breath. Feels like she drank too much caffeine and pt does admit to having more caffeine recently than normal. States has had COVID 3 times and has experienced the same symptom patterns in the past. Denies fever, cough, CP, LE swelling, or hx blood clots.

## 2024-10-01 NOTE — PHYSICAL EXAM
[No Edema] : there was no peripheral edema [Normal] : no rash [Coordination Grossly Intact] : coordination grossly intact [No Focal Deficits] : no focal deficits [Normal Gait] : normal gait [Normal Affect] : the affect was normal [Normal Insight/Judgement] : insight and judgment were intact [de-identified] : no color/size/temperature discrepancy between the bilateral lower extremities.

## 2024-10-01 NOTE — REVIEW OF SYSTEMS
[Fatigue] : fatigue [Palpitations] : palpitations [Shortness Of Breath] : shortness of breath [Negative] : Heme/Lymph [Fever] : no fever [Chills] : no chills [Chest Pain] : no chest pain [Lower Ext Edema] : no lower extremity edema [Wheezing] : no wheezing [Cough] : no cough [Dyspnea on Exertion] : no dyspnea on exertion

## 2024-10-21 ENCOUNTER — LABORATORY RESULT (OUTPATIENT)
Age: 54
End: 2024-10-21

## 2024-10-21 ENCOUNTER — APPOINTMENT (OUTPATIENT)
Dept: FAMILY MEDICINE | Facility: CLINIC | Age: 54
End: 2024-10-21
Payer: COMMERCIAL

## 2024-10-21 VITALS
WEIGHT: 145 LBS | HEIGHT: 68 IN | BODY MASS INDEX: 21.98 KG/M2 | TEMPERATURE: 97.7 F | SYSTOLIC BLOOD PRESSURE: 128 MMHG | DIASTOLIC BLOOD PRESSURE: 80 MMHG | OXYGEN SATURATION: 98 %

## 2024-10-21 DIAGNOSIS — Z00.00 ENCOUNTER FOR GENERAL ADULT MEDICAL EXAMINATION W/OUT ABNORMAL FINDINGS: ICD-10-CM

## 2024-10-21 DIAGNOSIS — F41.8 OTHER SPECIFIED ANXIETY DISORDERS: ICD-10-CM

## 2024-10-21 DIAGNOSIS — E78.00 PURE HYPERCHOLESTEROLEMIA, UNSPECIFIED: ICD-10-CM

## 2024-10-21 DIAGNOSIS — R42 DIZZINESS AND GIDDINESS: ICD-10-CM

## 2024-10-21 PROCEDURE — 99396 PREV VISIT EST AGE 40-64: CPT

## 2024-10-21 PROCEDURE — 36415 COLL VENOUS BLD VENIPUNCTURE: CPT

## 2024-10-22 DIAGNOSIS — R71.8 OTHER ABNORMALITY OF RED BLOOD CELLS: ICD-10-CM

## 2024-10-22 LAB
25(OH)D3 SERPL-MCNC: 31.1 NG/ML
ALBUMIN SERPL ELPH-MCNC: 4.8 G/DL
ALP BLD-CCNC: 90 U/L
ALT SERPL-CCNC: 12 U/L
ANION GAP SERPL CALC-SCNC: 14 MMOL/L
AST SERPL-CCNC: 18 U/L
BASOPHILS # BLD AUTO: 0.04 K/UL
BASOPHILS NFR BLD AUTO: 0.5 %
BILIRUB SERPL-MCNC: 1 MG/DL
BUN SERPL-MCNC: 10 MG/DL
CALCIUM SERPL-MCNC: 10.3 MG/DL
CHLORIDE SERPL-SCNC: 103 MMOL/L
CHOLEST SERPL-MCNC: 268 MG/DL
CO2 SERPL-SCNC: 25 MMOL/L
CREAT SERPL-MCNC: 0.7 MG/DL
EGFR: 102 ML/MIN/1.73M2
EOSINOPHIL # BLD AUTO: 0.07 K/UL
EOSINOPHIL NFR BLD AUTO: 1 %
GLUCOSE SERPL-MCNC: 90 MG/DL
HCT VFR BLD CALC: 44.8 %
HDLC SERPL-MCNC: 87 MG/DL
HGB BLD-MCNC: 14.1 G/DL
IMM GRANULOCYTES NFR BLD AUTO: 0.3 %
LDLC SERPL CALC-MCNC: 163 MG/DL
LYMPHOCYTES # BLD AUTO: 1.73 K/UL
LYMPHOCYTES NFR BLD AUTO: 23.5 %
MAN DIFF?: NORMAL
MCHC RBC-ENTMCNC: 31.5 GM/DL
MCHC RBC-ENTMCNC: 31.7 PG
MCV RBC AUTO: 100.7 FL
MONOCYTES # BLD AUTO: 0.38 K/UL
MONOCYTES NFR BLD AUTO: 5.2 %
NEUTROPHILS # BLD AUTO: 5.11 K/UL
NEUTROPHILS NFR BLD AUTO: 69.5 %
NONHDLC SERPL-MCNC: 181 MG/DL
PLATELET # BLD AUTO: 217 K/UL
POTASSIUM SERPL-SCNC: 4.6 MMOL/L
PROT SERPL-MCNC: 7.2 G/DL
RBC # BLD: 4.45 M/UL
RBC # FLD: 13.8 %
SODIUM SERPL-SCNC: 142 MMOL/L
TRIGL SERPL-MCNC: 108 MG/DL
TSH SERPL-ACNC: 1.32 UIU/ML
WBC # FLD AUTO: 7.35 K/UL

## 2024-11-01 DIAGNOSIS — R19.5 OTHER FECAL ABNORMALITIES: ICD-10-CM

## 2024-11-04 PROBLEM — R19.5 POSITIVE COLORECTAL CANCER SCREENING USING COLOGUARD TEST: Status: ACTIVE | Noted: 2024-11-04

## 2025-02-26 ENCOUNTER — APPOINTMENT (OUTPATIENT)
Dept: FAMILY MEDICINE | Facility: CLINIC | Age: 55
End: 2025-02-26
